# Patient Record
Sex: MALE | Race: BLACK OR AFRICAN AMERICAN | HISPANIC OR LATINO | Employment: FULL TIME | URBAN - METROPOLITAN AREA
[De-identification: names, ages, dates, MRNs, and addresses within clinical notes are randomized per-mention and may not be internally consistent; named-entity substitution may affect disease eponyms.]

---

## 2023-04-17 ENCOUNTER — OFFICE VISIT (OUTPATIENT)
Dept: FAMILY MEDICINE CLINIC | Facility: CLINIC | Age: 53
End: 2023-04-17
Payer: COMMERCIAL

## 2023-04-17 VITALS
BODY MASS INDEX: 33.32 KG/M2 | DIASTOLIC BLOOD PRESSURE: 92 MMHG | RESPIRATION RATE: 17 BRPM | WEIGHT: 238 LBS | HEIGHT: 71 IN | OXYGEN SATURATION: 97 % | SYSTOLIC BLOOD PRESSURE: 150 MMHG | HEART RATE: 80 BPM

## 2023-04-17 DIAGNOSIS — F32.A ANXIETY AND DEPRESSION: ICD-10-CM

## 2023-04-17 DIAGNOSIS — Z72.0 TOBACCO ABUSE: ICD-10-CM

## 2023-04-17 DIAGNOSIS — E55.9 VITAMIN D DEFICIENCY DISEASE: ICD-10-CM

## 2023-04-17 DIAGNOSIS — E66.9 OBESITY (BMI 30.0-34.9): ICD-10-CM

## 2023-04-17 DIAGNOSIS — I10 ESSENTIAL HYPERTENSION: ICD-10-CM

## 2023-04-17 DIAGNOSIS — B20 CURRENTLY ASYMPTOMATIC HIV INFECTION, WITH HISTORY OF HIV-RELATED ILLNESS: ICD-10-CM

## 2023-04-17 DIAGNOSIS — F41.9 ANXIETY AND DEPRESSION: ICD-10-CM

## 2023-04-17 DIAGNOSIS — R73.09 ELEVATED GLUCOSE: ICD-10-CM

## 2023-04-17 DIAGNOSIS — Z87.898 HISTORY OF SEIZURE: ICD-10-CM

## 2023-04-17 DIAGNOSIS — Z00.00 PREVENTATIVE HEALTH CARE: Primary | ICD-10-CM

## 2023-04-17 DIAGNOSIS — Z11.59 ENCOUNTER FOR HEPATITIS C SCREENING TEST FOR LOW RISK PATIENT: ICD-10-CM

## 2023-04-17 DIAGNOSIS — Z13.220 SCREENING, LIPID: ICD-10-CM

## 2023-04-17 DIAGNOSIS — Z13.228 SCREENING FOR METABOLIC DISORDER: ICD-10-CM

## 2023-04-17 DIAGNOSIS — Z85.038 HISTORY OF COLON CANCER: ICD-10-CM

## 2023-04-17 RX ORDER — BUPROPION HYDROCHLORIDE 150 MG/1
150 TABLET ORAL DAILY
Qty: 30 TABLET | Refills: 0 | Status: SHIPPED | OUTPATIENT
Start: 2023-04-17 | End: 2023-05-17

## 2023-04-17 RX ORDER — BICTEGRAVIR SODIUM, EMTRICITABINE, AND TENOFOVIR ALAFENAMIDE FUMARATE 50; 200; 25 MG/1; MG/1; MG/1
TABLET ORAL
COMMUNITY
Start: 2020-02-17

## 2023-04-17 RX ORDER — RILPIVIRINE HYDROCHLORIDE 25 MG/1
TABLET, FILM COATED ORAL
COMMUNITY
Start: 2020-04-17

## 2023-04-17 RX ORDER — ERGOCALCIFEROL 1.25 MG/1
50000 CAPSULE ORAL WEEKLY
COMMUNITY
End: 2023-04-19 | Stop reason: SDUPTHER

## 2023-04-17 NOTE — PROGRESS NOTES
Subjective   Piero Islas is a 53 y.o. male.     History of Present Illness   New pt here to establish PCP, has not seen anyone in 6 years. Due annual exam, labs, updated screening.    Chronic HIV , positive since 1994. [facility]. On Bikatrvy and endurant. No cough, no weight loss, no night sweats. H/o pneumonia. Feeling well overall.     Chronic hx of colon cancer 2005, history if unclear. Went through chemo per pt and no hx of surgical resection. No recent colonoscopy.     History of seizures with syncope since childhood. He reports he heats up from inside and feel this coming on. This happened 1-2 x month. He reports he was prev on Invokana for this, but reports he was never a diabetic.     Chronic htn x years. He is off BP meds. He was told by prev PCP to check this at home. Usually runs highs 150s/90s. Denies chest pain, palps, headache or vision changes. No daily meds    Chronic obesity x years. BMI 33. He is interested in trying GLP 1 for weight loss. He needs labs. He has gained weight over past year, goal weight 185lbs. He walks and exercising and no relief w dieting. Drinks water, some fruit punch.     Chronic tobacco abuse x years. He is trying to quit , would like to try wellbutrin. He has never tried chantix. PHQ-2 Depression Screening  Little interest or pleasure in doing things? 0-->not at all   Feeling down, depressed, or hopeless? 0-->not at all   PHQ-2 Total Score 0   He denies SI/HI. He has history of depression. He reports he was suicidal 30 + years ago. No recent SI/HI. Good support system.       The following portions of the patient's history were reviewed and updated as appropriate: allergies, current medications, past family history, past medical history, past social history, past surgical history and problem list.    Review of Systems   Constitutional: Negative for activity change, diaphoresis, fatigue, fever and unexpected weight loss.   HENT: Negative for congestion.         Dental  exam is due      Eyes: Negative for blurred vision and visual disturbance.        Contacts, eye exam is due      Respiratory: Negative for cough, shortness of breath and wheezing.    Cardiovascular: Negative for chest pain, palpitations and leg swelling.   Gastrointestinal: Positive for GERD. Negative for abdominal distention, abdominal pain, blood in stool, constipation, diarrhea, nausea and vomiting.   Endocrine: Negative for cold intolerance, heat intolerance, polydipsia, polyphagia and polyuria.   Genitourinary: Negative for dysuria, penile pain, scrotal swelling, testicular pain and urinary incontinence.   Musculoskeletal: Positive for arthralgias (R knee) and back pain (DDD).   Skin: Negative for rash.   Allergic/Immunologic: Negative for environmental allergies.   Neurological: Positive for seizures and syncope. Negative for weakness and headache.   Hematological: Does not bruise/bleed easily.   Psychiatric/Behavioral: Positive for depressed mood. Negative for sleep disturbance and suicidal ideas. The patient is nervous/anxious.        Objective   Physical Exam  Vitals reviewed.   Constitutional:       Appearance: Normal appearance. He is obese.   HENT:      Head: Normocephalic.      Right Ear: Tympanic membrane normal.      Left Ear: Tympanic membrane normal.      Nose: Nose normal.      Mouth/Throat:      Mouth: Mucous membranes are moist.   Eyes:      Pupils: Pupils are equal, round, and reactive to light.   Cardiovascular:      Rate and Rhythm: Normal rate and regular rhythm.      Pulses: Normal pulses.      Heart sounds: Normal heart sounds.   Pulmonary:      Effort: Pulmonary effort is normal.      Breath sounds: Normal breath sounds.   Abdominal:      General: Bowel sounds are normal.      Palpations: Abdomen is soft.   Musculoskeletal:         General: Normal range of motion.      Cervical back: Normal range of motion.   Skin:     General: Skin is warm.   Neurological:      General: No focal deficit  present.      Mental Status: He is alert.   Psychiatric:         Mood and Affect: Mood is anxious and depressed.         Vitals:    04/17/23 1008   BP: 150/92   Pulse: 80   Resp: 17   SpO2: 97%     Body mass index is 33.19 kg/m².    Procedures    Assessment & Plan   Problems Addressed this Visit    None  Visit Diagnoses     Preventative health care    -  Primary    Screening for metabolic disorder        Relevant Orders    CBC & Differential    Comprehensive metabolic panel    TSH Rfx On Abnormal To Free T4    Hemoglobin A1c    Screening, lipid        Encounter for hepatitis C screening test for low risk patient        Relevant Orders    Hepatitis C Antibody    Currently asymptomatic HIV infection, with history of HIV-related illness        Relevant Medications    Bictegravir-Emtricitab-Tenofov (Biktarvy) -25 MG per tablet    rilpivirine (Edurant) 25 MG tablet tablet    History of seizure        Relevant Orders    Ambulatory Referral to Neurology    Essential hypertension        History of colon cancer        Relevant Orders    Ambulatory Referral to Gastroenterology    CBC & Differential    Comprehensive metabolic panel    Obesity (BMI 30.0-34.9)        Relevant Orders    Hepatitis C Antibody    Tobacco abuse        Anxiety and depression        Relevant Medications    buPROPion XL (Wellbutrin XL) 150 MG 24 hr tablet    Other Relevant Orders    TSH Rfx On Abnormal To Free T4    Vitamin D deficiency disease        Relevant Orders    Vitamin D 25 hydroxy    Elevated glucose        Relevant Orders    Hemoglobin A1c      Diagnoses       Codes Comments    Preventative health care    -  Primary ICD-10-CM: Z00.00  ICD-9-CM: V70.0     Screening for metabolic disorder     ICD-10-CM: Z13.228  ICD-9-CM: V77.99     Screening, lipid     ICD-10-CM: Z13.220  ICD-9-CM: V77.91     Encounter for hepatitis C screening test for low risk patient     ICD-10-CM: Z11.59  ICD-9-CM: V73.89     Currently asymptomatic HIV infection,  with history of HIV-related illness     ICD-10-CM: B20  ICD-9-CM: 042     History of seizure     ICD-10-CM: Z87.898  ICD-9-CM: V13.89     Essential hypertension     ICD-10-CM: I10  ICD-9-CM: 401.9     History of colon cancer     ICD-10-CM: Z85.038  ICD-9-CM: V10.05     Obesity (BMI 30.0-34.9)     ICD-10-CM: E66.9  ICD-9-CM: 278.00     Tobacco abuse     ICD-10-CM: Z72.0  ICD-9-CM: 305.1     Anxiety and depression     ICD-10-CM: F41.9, F32.A  ICD-9-CM: 300.00, 311     Vitamin D deficiency disease     ICD-10-CM: E55.9  ICD-9-CM: 268.9     Elevated glucose     ICD-10-CM: R73.09  ICD-9-CM: 790.29         Orders Placed This Encounter   Procedures   • COVID-19 Bivalent Booster (Pfizer) 12+yrs   • Tdap Vaccine Greater Than or Equal To 6yo IM   • Comprehensive metabolic panel     Order Specific Question:   Release to patient     Answer:   Routine Release   • TSH Rfx On Abnormal To Free T4     Order Specific Question:   Release to patient     Answer:   Routine Release   • Hemoglobin A1c     Order Specific Question:   Release to patient     Answer:   Routine Release   • Hepatitis C Antibody     Order Specific Question:   Release to patient     Answer:   Routine Release   • Vitamin D 25 hydroxy     Order Specific Question:   Release to patient     Answer:   Routine Release   • Ambulatory Referral to Gastroenterology     Referral Priority:   Routine     Referral Type:   Consultation     Referral Reason:   Specialty Services Required     Requested Specialty:   Gastroenterology     Number of Visits Requested:   1   • Ambulatory Referral to Neurology     Referral Priority:   Routine     Referral Type:   Consultation     Referral Reason:   Specialty Services Required     Requested Specialty:   Neurology     Number of Visits Requested:   1   • CBC & Differential       Preventative care- Follow heart healthy diet, drink water, walk daily. Wear seatbelts, wear helmets, wear sunscreens. Follow CDC guidelines for covid pandemic.     HIV-  follow [facility], practice safe sex    H.o seizure/syncope- refer neuro    HTN- check BP 3 x week, send BP log, check labs, Low na diet, work on weight loss    H/o colon ca- refer GI, likely needs updated colonoscopy    Tobacco abuse- start wellbutrin 150xl qd, reviewed s/e profile    BMI 33, elevated glucose- check a1c, would like to coinsider GLP 1, will discuss this once labs back at a later day            additional time acute concerns, referral, med mgmnt > 24 min  Education provided in AVS   Return in about 6 months (around 10/17/2023) for Recheck.

## 2023-04-18 LAB
25(OH)D3+25(OH)D2 SERPL-MCNC: 12.7 NG/ML (ref 30–100)
ALBUMIN SERPL-MCNC: 4.1 G/DL (ref 3.5–5.2)
ALBUMIN/GLOB SERPL: 1.1 G/DL
ALP SERPL-CCNC: 85 U/L (ref 39–117)
ALT SERPL-CCNC: 33 U/L (ref 1–41)
AST SERPL-CCNC: 29 U/L (ref 1–40)
BASOPHILS # BLD AUTO: 0.02 10*3/MM3 (ref 0–0.2)
BASOPHILS NFR BLD AUTO: 0.5 % (ref 0–1.5)
BILIRUB SERPL-MCNC: 0.5 MG/DL (ref 0–1.2)
BUN SERPL-MCNC: 11 MG/DL (ref 6–20)
BUN/CREAT SERPL: 11.8 (ref 7–25)
CALCIUM SERPL-MCNC: 9.6 MG/DL (ref 8.6–10.5)
CHLORIDE SERPL-SCNC: 105 MMOL/L (ref 98–107)
CO2 SERPL-SCNC: 25.5 MMOL/L (ref 22–29)
CREAT SERPL-MCNC: 0.93 MG/DL (ref 0.76–1.27)
EGFRCR SERPLBLD CKD-EPI 2021: 97.6 ML/MIN/1.73
EOSINOPHIL # BLD AUTO: 0.04 10*3/MM3 (ref 0–0.4)
EOSINOPHIL NFR BLD AUTO: 0.9 % (ref 0.3–6.2)
ERYTHROCYTE [DISTWIDTH] IN BLOOD BY AUTOMATED COUNT: 13.6 % (ref 12.3–15.4)
GLOBULIN SER CALC-MCNC: 3.6 GM/DL
GLUCOSE SERPL-MCNC: 93 MG/DL (ref 65–99)
HBA1C MFR BLD: 5.4 % (ref 4.8–5.6)
HCT VFR BLD AUTO: 45.9 % (ref 37.5–51)
HCV IGG SERPL QL IA: REACTIVE
HGB BLD-MCNC: 16 G/DL (ref 13–17.7)
IMM GRANULOCYTES # BLD AUTO: 0.01 10*3/MM3 (ref 0–0.05)
IMM GRANULOCYTES NFR BLD AUTO: 0.2 % (ref 0–0.5)
LYMPHOCYTES # BLD AUTO: 2.11 10*3/MM3 (ref 0.7–3.1)
LYMPHOCYTES NFR BLD AUTO: 50 % (ref 19.6–45.3)
MCH RBC QN AUTO: 32.1 PG (ref 26.6–33)
MCHC RBC AUTO-ENTMCNC: 34.9 G/DL (ref 31.5–35.7)
MCV RBC AUTO: 92 FL (ref 79–97)
MONOCYTES # BLD AUTO: 0.47 10*3/MM3 (ref 0.1–0.9)
MONOCYTES NFR BLD AUTO: 11.1 % (ref 5–12)
NEUTROPHILS # BLD AUTO: 1.57 10*3/MM3 (ref 1.7–7)
NEUTROPHILS NFR BLD AUTO: 37.3 % (ref 42.7–76)
NRBC BLD AUTO-RTO: 0 /100 WBC (ref 0–0.2)
PLATELET # BLD AUTO: 250 10*3/MM3 (ref 140–450)
POTASSIUM SERPL-SCNC: 4.2 MMOL/L (ref 3.5–5.2)
PROT SERPL-MCNC: 7.7 G/DL (ref 6–8.5)
RBC # BLD AUTO: 4.99 10*6/MM3 (ref 4.14–5.8)
SODIUM SERPL-SCNC: 139 MMOL/L (ref 136–145)
TSH SERPL DL<=0.005 MIU/L-ACNC: 0.9 UIU/ML (ref 0.27–4.2)
WBC # BLD AUTO: 4.22 10*3/MM3 (ref 3.4–10.8)

## 2023-04-19 RX ORDER — ERGOCALCIFEROL 1.25 MG/1
50000 CAPSULE ORAL WEEKLY
Qty: 8 CAPSULE | Refills: 0 | Status: SHIPPED | OUTPATIENT
Start: 2023-04-19

## 2023-04-20 ENCOUNTER — PATIENT ROUNDING (BHMG ONLY) (OUTPATIENT)
Dept: FAMILY MEDICINE CLINIC | Facility: CLINIC | Age: 53
End: 2023-04-20
Payer: COMMERCIAL

## 2023-04-20 NOTE — PROGRESS NOTES
A [facility] message has been sent to the patient for PATIENT ROUNDING with [facility].

## 2023-04-25 DIAGNOSIS — Z11.59 ENCOUNTER FOR HEPATITIS C SCREENING TEST FOR LOW RISK PATIENT: Primary | ICD-10-CM

## 2023-05-12 ENCOUNTER — TELEPHONE (OUTPATIENT)
Dept: FAMILY MEDICINE CLINIC | Facility: CLINIC | Age: 53
End: 2023-05-12
Payer: COMMERCIAL

## 2023-05-12 NOTE — TELEPHONE ENCOUNTER
----- Message from Nicol Shepherd MA sent at 5/9/2023  9:21 AM EDT -----  Can you please schedule a lab only appointment for this pt 6 weeks from now, please and thank you!  ----- Message -----  From: Tonya Pimentel APRN  Sent: 4/27/2023  11:46 AM EDT  To: Nicol Shepherd MA    Can you have him come back in 8 weeks for vit d and hep c quant   ----- Message -----  From: Nicol Shepherd MA  Sent: 4/27/2023   9:27 AM EDT  To: RONNIE Coleman    He does not remember what he was treated for, either hep c or hep b. I told him we had to add a lab and he will schedule an appointment with lab. I will ask where I can get records from  ----- Message -----  From: Tonya Pimentel APRN  Sent: 4/25/2023   5:02 PM EDT  To: Nicol Shepherd MA    Please ask him if he has had treatment for hepatitis C and let him know we will have to add a lab to make sure he does not have active virus.  I am pretty sure he had hepatitis in the past in which case the antibody would always be positive on his blood work.  His liver enzymes look normal so I presume this is all past infection.  If he has been treated for hep C no need to check labs again.  If he could let us know where to request his old medical records that would be helpful

## 2023-05-16 RX ORDER — BUPROPION HYDROCHLORIDE 150 MG/1
TABLET ORAL
Qty: 30 TABLET | Refills: 0 | Status: SHIPPED | OUTPATIENT
Start: 2023-05-16

## 2023-06-19 RX ORDER — ERGOCALCIFEROL 1.25 MG/1
CAPSULE ORAL
Qty: 8 CAPSULE | Refills: 0 | Status: SHIPPED | OUTPATIENT
Start: 2023-06-19

## 2023-08-22 RX ORDER — ERGOCALCIFEROL 1.25 MG/1
CAPSULE ORAL
Qty: 8 CAPSULE | Refills: 0 | Status: SHIPPED | OUTPATIENT
Start: 2023-08-22

## 2023-10-17 ENCOUNTER — OFFICE VISIT (OUTPATIENT)
Dept: FAMILY MEDICINE CLINIC | Facility: CLINIC | Age: 53
End: 2023-10-17
Payer: COMMERCIAL

## 2023-10-17 VITALS
OXYGEN SATURATION: 95 % | BODY MASS INDEX: 32.78 KG/M2 | HEIGHT: 72 IN | HEART RATE: 72 BPM | RESPIRATION RATE: 18 BRPM | WEIGHT: 242 LBS | SYSTOLIC BLOOD PRESSURE: 142 MMHG | DIASTOLIC BLOOD PRESSURE: 80 MMHG

## 2023-10-17 DIAGNOSIS — E55.9 VITAMIN D DEFICIENCY DISEASE: ICD-10-CM

## 2023-10-17 DIAGNOSIS — I10 ESSENTIAL HYPERTENSION: Primary | ICD-10-CM

## 2023-10-17 RX ORDER — OLMESARTAN MEDOXOMIL 20 MG/1
20 TABLET ORAL DAILY
Qty: 30 TABLET | Refills: 0 | Status: SHIPPED | OUTPATIENT
Start: 2023-10-17 | End: 2023-10-17

## 2023-10-17 RX ORDER — OLMESARTAN MEDOXOMIL 20 MG/1
20 TABLET ORAL DAILY
Qty: 90 TABLET | Refills: 0 | Status: SHIPPED | OUTPATIENT
Start: 2023-10-17

## 2023-10-17 NOTE — PROGRESS NOTES
Subjective   Piero Islas is a 53 y.o. male.     Obesity       Estab pt here for 6 month follow up, he would like to discuss weight loss medication    Chronically elevated BP.he has never been on medication for hypertension.  He has been checking this at home and /100 on initial check., He denies chest pain, palps, headache or vision changes. Complicated by BMI 32.     Chronic Vit D deficiency, last labs vitamin D 12.7, on weekly vit D 50,000 units.    Chronic obesity X years.  BMI 32, he is wanting GLP1 medication. He walks daily. He clark not follow strict diet. A1c 5.4.  Patient has tried exercise and dieting.    He reports he has quit smoking. Stopped wellbutrin.  He denies coughing, wheezing or shortness of breath    Follows with [facility] for [facility].  Stable on Biktarvy.    The following portions of the patient's history were reviewed and updated as appropriate: allergies, current medications, past family history, past medical history, past social history, past surgical history and problem list.    Review of Systems    Objective   Physical Exam  Vitals reviewed.   Constitutional:       Appearance: Normal appearance. He is obese.   HENT:      Mouth/Throat:      Mouth: Mucous membranes are moist.   Cardiovascular:      Rate and Rhythm: Normal rate and regular rhythm.      Pulses: Normal pulses.      Heart sounds: Normal heart sounds.   Pulmonary:      Effort: Pulmonary effort is normal.      Breath sounds: Normal breath sounds.   Abdominal:      General: Bowel sounds are normal.      Palpations: Abdomen is soft.   Musculoskeletal:         General: Normal range of motion.      Cervical back: Normal range of motion and neck supple.   Skin:     General: Skin is warm.   Neurological:      Mental Status: He is alert.         Vitals:    10/17/23 0820   BP: 142/80   Pulse: 72   Resp: 18   SpO2: 95%     Body mass index is 32.82 kg/m².    Procedures    Assessment & Plan   Problems Addressed this Visit       BMI  32.0-32.9,adult     Other Visit Diagnoses       Essential hypertension    -  Primary    Vitamin D deficiency disease              Diagnoses         Codes Comments    Essential hypertension    -  Primary ICD-10-CM: I10  ICD-9-CM: 401.9     BMI 32.0-32.9,adult     ICD-10-CM: Z68.32  ICD-9-CM: V85.32     Vitamin D deficiency disease     ICD-10-CM: E55.9  ICD-9-CM: 268.9           Hypertension-start olmesartan 20 mg daily, encourage low-sodium diet, walking daily, drink mostly water, avoid stress and stimulants  Monitor blood pressure at home with goal less than 130/80.    Obesity-reviewed risk versus benefit of GLP-1 agonist.  Patient has had normal thyroid function.  He is not diabetic, he is aware that these drugs are difficult to obtain, may not be covered by insurance.  Reviewed side effect profile including black box warning of thyroid cancer.  Patient wishes to try Wegovy 0.25 mg weekly.  He will return to office to learn how to do subcu injections  Follow-up in 1 week        Body mass index is 32.82 kg/m².             Education provided in AVS   Return in about 4 weeks (around 11/14/2023) for Recheck.

## 2023-11-21 ENCOUNTER — OFFICE VISIT (OUTPATIENT)
Dept: FAMILY MEDICINE CLINIC | Facility: CLINIC | Age: 53
End: 2023-11-21
Payer: COMMERCIAL

## 2023-11-21 VITALS
OXYGEN SATURATION: 99 % | HEART RATE: 74 BPM | WEIGHT: 249 LBS | HEIGHT: 72 IN | SYSTOLIC BLOOD PRESSURE: 142 MMHG | RESPIRATION RATE: 18 BRPM | DIASTOLIC BLOOD PRESSURE: 102 MMHG | BODY MASS INDEX: 33.72 KG/M2

## 2023-11-21 DIAGNOSIS — Z86.19 HISTORY OF HEPATITIS C: ICD-10-CM

## 2023-11-21 DIAGNOSIS — E55.9 VITAMIN D DEFICIENCY DISEASE: ICD-10-CM

## 2023-11-21 DIAGNOSIS — E66.09 CLASS 1 OBESITY DUE TO EXCESS CALORIES WITH SERIOUS COMORBIDITY AND BODY MASS INDEX (BMI) OF 33.0 TO 33.9 IN ADULT: ICD-10-CM

## 2023-11-21 DIAGNOSIS — I10 PRIMARY HYPERTENSION: Primary | ICD-10-CM

## 2023-11-21 RX ORDER — LISINOPRIL 20 MG/1
20 TABLET ORAL DAILY
Qty: 30 TABLET | Refills: 0 | Status: SHIPPED | OUTPATIENT
Start: 2023-11-21 | End: 2023-11-21

## 2023-11-21 RX ORDER — AMLODIPINE BESYLATE 5 MG/1
5 TABLET ORAL DAILY
Qty: 90 TABLET | Refills: 0 | Status: SHIPPED | OUTPATIENT
Start: 2023-11-21

## 2023-11-21 RX ORDER — AMLODIPINE BESYLATE 5 MG/1
5 TABLET ORAL DAILY
Qty: 30 TABLET | Refills: 0 | Status: SHIPPED | OUTPATIENT
Start: 2023-11-21 | End: 2023-11-21

## 2023-11-21 RX ORDER — LISINOPRIL 20 MG/1
20 TABLET ORAL DAILY
Qty: 90 TABLET | Refills: 0 | Status: SHIPPED | OUTPATIENT
Start: 2023-11-21

## 2023-11-21 NOTE — PROGRESS NOTES
Subjective   Piero Islas is a 53 y.o. male.     Hypertension    Estab pt here for follow up Htn.     Acute on Chronic uncontrolled hypertension. Recently started olmesartan 20 mg 10/17/23. He reports this makes him feel achey and fatigued. . He stopped taking med > 1 week ago. /102. He denies chest pain, palps, headache or vision changes. Complicated by BMI 33.  he is working on weight loss.     He is on semaglutide from online provider for weight loss. BMI 33. He has some GERD and constipation. He uses zofran as needed for nausea.     Chronic vit D deficiency. Last Vit D 12.7. He has monthly vit D 96822 units qw. He has not been taking this as ordered.+ fatigue    History of Hep C, liver enzymes wnl. He reports he had treatment for this.     The following portions of the patient's history were reviewed and updated as appropriate: allergies, current medications, past family history, past medical history, past social history, past surgical history and problem list.    Review of Systems    Objective   Physical Exam  Vitals reviewed.   Constitutional:       Appearance: Normal appearance. He is obese.   HENT:      Mouth/Throat:      Mouth: Mucous membranes are moist.   Cardiovascular:      Rate and Rhythm: Normal rate and regular rhythm.      Pulses: Normal pulses.      Heart sounds: Normal heart sounds.   Pulmonary:      Effort: Pulmonary effort is normal.      Breath sounds: Normal breath sounds.   Abdominal:      General: Bowel sounds are normal.   Musculoskeletal:         General: Normal range of motion.   Skin:     General: Skin is warm.   Neurological:      General: No focal deficit present.      Mental Status: He is alert.         Vitals:    11/21/23 0830   BP: (!) 142/102   Pulse: 74   Resp: 18   SpO2: 99%     Body mass index is 33.77 kg/m².    Procedures    Assessment & Plan   Problems Addressed this Visit    None  Visit Diagnoses       Primary hypertension    -  Primary    Relevant Orders     Comprehensive metabolic panel    TSH Rfx On Abnormal To Free T4    Class 1 obesity due to excess calories with serious comorbidity and body mass index (BMI) of 33.0 to 33.9 in adult        History of hepatitis C        Relevant Orders    Hepatitis C RNA, Quantitative, PCR (graph)    Vitamin D deficiency disease              Diagnoses         Codes Comments    Primary hypertension    -  Primary ICD-10-CM: I10  ICD-9-CM: 401.9     Class 1 obesity due to excess calories with serious comorbidity and body mass index (BMI) of 33.0 to 33.9 in adult     ICD-10-CM: E66.09, Z68.33  ICD-9-CM: 278.00, V85.33     History of hepatitis C     ICD-10-CM: Z86.19  ICD-9-CM: V12.09     Vitamin D deficiency disease     ICD-10-CM: E55.9  ICD-9-CM: 268.9           Orders Placed This Encounter   Procedures    COVID-19 F23 (Pfizer) 12yrs+ (COMIRNATY)    Hepatitis C RNA, Quantitative, PCR (graph)     Order Specific Question:   Release to patient     Answer:   Routine Release [[id number]]    Comprehensive metabolic panel     Order Specific Question:   Release to patient     Answer:   Routine Release [[id number]]    TSH Rfx On Abnormal To Free T4     Order Specific Question:   Release to patient     Answer:   Routine Release [[id number]]       HTN- rx amlodipine 5 mg qd, lisinopril 20 qd, reviewed med s/e profile. , enc low na diet, walk daily    Obesity- enc weight loss, walk daily, check labs w GLP 1 meds    History of hep C- check he c quant    Vit d def- enc weekly vit D        Education provided in AVS   No follow-ups on file.

## 2023-11-22 LAB
ALBUMIN SERPL-MCNC: 4.2 G/DL (ref 3.8–4.9)
ALBUMIN/GLOB SERPL: 1.4 {RATIO} (ref 1.2–2.2)
ALP SERPL-CCNC: 68 IU/L (ref 44–121)
ALT SERPL-CCNC: 39 IU/L (ref 0–44)
AST SERPL-CCNC: 34 IU/L (ref 0–40)
BILIRUB SERPL-MCNC: 0.3 MG/DL (ref 0–1.2)
BUN SERPL-MCNC: 8 MG/DL (ref 6–24)
BUN/CREAT SERPL: 8 (ref 9–20)
CALCIUM SERPL-MCNC: 9.3 MG/DL (ref 8.7–10.2)
CHLORIDE SERPL-SCNC: 104 MMOL/L (ref 96–106)
CO2 SERPL-SCNC: 19 MMOL/L (ref 20–29)
CREAT SERPL-MCNC: 0.99 MG/DL (ref 0.76–1.27)
EGFRCR SERPLBLD CKD-EPI 2021: 91 ML/MIN/1.73
GLOBULIN SER CALC-MCNC: 3.1 G/DL (ref 1.5–4.5)
GLUCOSE SERPL-MCNC: 84 MG/DL (ref 70–99)
POTASSIUM SERPL-SCNC: 4.4 MMOL/L (ref 3.5–5.2)
PROT SERPL-MCNC: 7.3 G/DL (ref 6–8.5)
SODIUM SERPL-SCNC: 138 MMOL/L (ref 134–144)
TSH SERPL DL<=0.005 MIU/L-ACNC: 0.82 UIU/ML (ref 0.45–4.5)

## 2023-11-23 LAB
HCV RNA SERPL NAA+PROBE-ACNC: NORMAL IU/ML
HCV RNA SERPL NAA+PROBE-LOG IU: 5.67 LOG10 IU/ML
TEST INFORMATION: NORMAL

## 2023-11-27 DIAGNOSIS — Z86.19 HISTORY OF HEPATITIS C: Primary | ICD-10-CM

## 2023-12-05 ENCOUNTER — TELEPHONE (OUTPATIENT)
Dept: FAMILY MEDICINE CLINIC | Facility: CLINIC | Age: 53
End: 2023-12-05

## 2023-12-05 NOTE — TELEPHONE ENCOUNTER
PATIENT WANTS TO KNOW IF HE NEEDS TO GET THE SPIKEVEX BY [employer] SINCE HE RECENTLY GOT A DIFFERENT COVID BOOSTER.

## 2024-01-03 ENCOUNTER — TELEPHONE (OUTPATIENT)
Dept: FAMILY MEDICINE CLINIC | Facility: CLINIC | Age: 54
End: 2024-01-03
Payer: COMMERCIAL

## 2024-01-03 NOTE — TELEPHONE ENCOUNTER
Caller: Piero Islas    Relationship: Self    Best call back number: [phone]     What medication are you requesting: ZOFRAN    What are your current symptoms: NAUSEATED    If a prescription is needed, what is your preferred pharmacy and phone number: [id number] [place] [id number] [facility] [phone] [facility] [phone]      Additional notes: PATIENT STATED HE HAS BEEN EXPERIENCING NAUSEA SINCE BEING ON THE SEMAGLUTIDE MEDICATION.    PATIENT IS REQUESTING ZOFRAN TO HELP WITH THIS.

## 2024-01-04 RX ORDER — ONDANSETRON 4 MG/1
4 TABLET, FILM COATED ORAL EVERY 8 HOURS PRN
Qty: 30 TABLET | Refills: 0 | Status: SHIPPED | OUTPATIENT
Start: 2024-01-04

## 2024-01-04 NOTE — TELEPHONE ENCOUNTER
Covering provider Shaina Yancey approved sending in Zofran for patient's nausea. Patient notified.

## 2024-01-05 RX ORDER — OLMESARTAN MEDOXOMIL 20 MG/1
20 TABLET ORAL DAILY
Qty: 90 TABLET | Refills: 0 | OUTPATIENT
Start: 2024-01-05

## 2024-02-27 RX ORDER — LISINOPRIL 20 MG/1
20 TABLET ORAL DAILY
Qty: 90 TABLET | Refills: 0 | Status: SHIPPED | OUTPATIENT
Start: 2024-02-27

## 2024-02-27 RX ORDER — AMLODIPINE BESYLATE 5 MG/1
5 TABLET ORAL DAILY
Qty: 90 TABLET | Refills: 0 | Status: SHIPPED | OUTPATIENT
Start: 2024-02-27

## 2024-05-28 RX ORDER — LISINOPRIL 20 MG/1
20 TABLET ORAL DAILY
Qty: 90 TABLET | Refills: 0 | Status: SHIPPED | OUTPATIENT
Start: 2024-05-28

## 2024-05-28 RX ORDER — AMLODIPINE BESYLATE 5 MG/1
5 TABLET ORAL DAILY
Qty: 90 TABLET | Refills: 0 | Status: SHIPPED | OUTPATIENT
Start: 2024-05-28

## 2024-08-26 RX ORDER — AMLODIPINE BESYLATE 5 MG/1
5 TABLET ORAL DAILY
Qty: 90 TABLET | Refills: 0 | Status: SHIPPED | OUTPATIENT
Start: 2024-08-26

## 2024-08-26 RX ORDER — LISINOPRIL 20 MG/1
20 TABLET ORAL DAILY
Qty: 90 TABLET | Refills: 0 | Status: SHIPPED | OUTPATIENT
Start: 2024-08-26

## 2024-12-09 RX ORDER — LISINOPRIL 20 MG/1
20 TABLET ORAL DAILY
Qty: 90 TABLET | Refills: 0 | OUTPATIENT
Start: 2024-12-09

## 2024-12-09 RX ORDER — AMLODIPINE BESYLATE 5 MG/1
5 TABLET ORAL DAILY
Qty: 90 TABLET | Refills: 0 | OUTPATIENT
Start: 2024-12-09